# Patient Record
Sex: MALE | Race: WHITE | Employment: FULL TIME | URBAN - METROPOLITAN AREA
[De-identification: names, ages, dates, MRNs, and addresses within clinical notes are randomized per-mention and may not be internally consistent; named-entity substitution may affect disease eponyms.]

---

## 2020-06-19 ENCOUNTER — OFFICE VISIT (OUTPATIENT)
Dept: PODIATRY | Age: 60
End: 2020-06-19
Payer: COMMERCIAL

## 2020-06-19 VITALS — WEIGHT: 214 LBS | TEMPERATURE: 98.8 F | RESPIRATION RATE: 18 BRPM | BODY MASS INDEX: 28.99 KG/M2 | HEIGHT: 72 IN

## 2020-06-19 PROCEDURE — 99203 OFFICE O/P NEW LOW 30 MIN: CPT | Performed by: PODIATRIST

## 2020-06-19 PROCEDURE — 11721 DEBRIDE NAIL 6 OR MORE: CPT | Performed by: PODIATRIST

## 2020-06-19 RX ORDER — GLIMEPIRIDE 4 MG/1
4 TABLET ORAL
COMMUNITY

## 2020-06-19 RX ORDER — LISINOPRIL 10 MG/1
10 TABLET ORAL DAILY
COMMUNITY

## 2020-06-19 RX ORDER — FINASTERIDE 5 MG/1
5 TABLET, FILM COATED ORAL DAILY
COMMUNITY

## 2020-06-19 RX ORDER — ATORVASTATIN CALCIUM 20 MG/1
20 TABLET, FILM COATED ORAL DAILY
COMMUNITY

## 2020-06-19 NOTE — PROGRESS NOTES
[facility] PHYSICIANS  [facility]  [address] [place] [address]  Dept: [phone]  Dept Fax: [phone]    DIABETIC PROGRESS NOTE  Date of patient's visit: 6/19/2020  Patient's Name:  Izaiah Sky YOB: 1960            Patient Care Team:  Connell Moritz, MD as PCP - General (Family Medicine)  La Haines DPM as Physician (Podiatry)          Chief Complaint   Patient presents with    New Patient    Diabetes    Peripheral Neuropathy    Foot Pain    Nail Problem       Subjective:   Izaiah Sky comes to clinic for New Patient; Diabetes; Peripheral Neuropathy; Foot Pain; and Nail Problem    he is a diabetic and states that he needs a diabetic exam.  Pt currently has complaint of thickened, elongated nails that they cannot manage by themselves. Pt's primary care physician is Connell Moritz, MD last seen 06/04/2020. Pt's last blood sugar was unknown . No results found for: LABA1C   Complains of numbness in the feet bilat. No past medical history on file. Allergies not on file  Current Outpatient Medications on File Prior to Visit   Medication Sig Dispense Refill    atorvastatin (LIPITOR) 20 MG tablet Take 20 mg by mouth daily      finasteride (PROSCAR) 5 MG tablet Take 5 mg by mouth daily      glimepiride (AMARYL) 4 MG tablet Take 4 mg by mouth every morning (before breakfast)      lisinopril (PRINIVIL;ZESTRIL) 10 MG tablet Take 10 mg by mouth daily      metFORMIN (GLUCOPHAGE) 1000 MG tablet Take 1,000 mg by mouth 2 times daily (with meals)      SITagliptin (JANUVIA) 100 MG tablet Take 100 mg by mouth daily       No current facility-administered medications on file prior to visit.         Review of Systems    Review of Systems:   History obtained from chart review and the patient  General ROS: negative for - chills, fatigue, fever, night sweats or weight gain  Constitutional: Negative for chills, diaphoresis, fatigue, fever and unexpected weight change. Musculoskeletal: Positive for arthralgias, gait problem and joint swelling. Neurological ROS: negative for - behavioral changes, confusion, headaches or seizures. Negative for weakness and numbness. Dermatological ROS: negative for - mole changes, rash  Cardiovascular: Negative for leg swelling. Gastrointestinal: Negative for constipation, diarrhea, nausea and vomiting. Objective:  Dermatologic Exam:  Skin lesion/ulceration Absent . Skin No rashes or nodules noted. .   Skin is thin, with flaky sloughing skin as well as decreased hair growth to the lower leg  Small red hemosiderin deposits seen dorsal foot   Musculoskeletal:     1st MPJ ROM decreased, Bilateral.  Muscle strength 5/5, Bilateral.  Pain present upon palpation of toenails 1-5, Bilateral. decreased medial longitudinal arch, Bilateral.  Ankle ROM decreased,Bilateral.    Dorsally contracted digits present digits 2, Bilateral.     Vascular: DP pulses 1/4 bilateral.  PT pulses 0/4 bilateral.   CFT <5 seconds, Bilateral.  Hair growth absent to the level of the digits, Bilateral.  Edema present, Bilateral.  Varicosities absent, Bilateral. Erythema absent, Bilateral    Neurological: Sensation diminshed to light touch to level of digits, Bilateral.  Protective sensation intact 6/10 sites via 5.07/10g [place] Monofilament, Bilateral.  negative Tinel's, Bilateral.  negative Valleix sign, Bilateral.      Integument: Warm, dry, supple, Bilateral.  Open lesion absent, Bilateral.  Interdigital maceration absent to web spaces 4, Bilateral.  Nails 1-5 left and 1-5 right thickened > 3.0 mm, dystrophic and crumbly, discolored with subungual debris. Fissures absent, Bilateral.   General: AAO x 3 in NAD.     Derm  Toenail Description  Sites of Onychomycosis Involvement (Check affected area)  [x] [x] [x] [x] [x] [x] [x] [x] [x] [x]  5 4 3 2 1 1 2 3 4 5                          Right PRESSURES W PPG BILATERAL         Q7   []Yes    []No                Q8   [x]Yes    []No                     Q9   []Yes    []No    Plan:   Pt was evaluated and examined. Patient was given personalized discharge instructions. I am ordering non invasive vasuclar studies the patients lower legs are extremely cold and discolored with cyanosis. The patient has decreased hair growth to the lower extremites with a delayed capillary refill time. This test is necessary to see if vascular surgery involvement is necessary and would relieve patient of their painful symptoms. Nails 1-10 were debrided sharply in length and thickness with a nipper and , without incident. Pt will follow up in 9 weeks or sooner if any problems arise. Diagnosis was discussed with the pt and all of their questions were answered in detail. Proper foot hygiene and care was discussed with the pt. Informed patient on proper diabetic foot care and importance of tight glycemic control. Patient to check feet daily and contact the office with any questions/problems/concerns.    Other comorbidity noted and will be managed by PCP.  6/19/2020    Electronically signed by Ced Tapia DPM on 6/19/2020 at 9:37 AM  6/19/2020

## 2023-12-04 ENCOUNTER — HOSPITAL ENCOUNTER (OUTPATIENT)
Dept: PHYSICAL THERAPY | Age: 63
Setting detail: THERAPIES SERIES
Discharge: HOME | End: 2023-12-04
Payer: COMMERCIAL

## 2023-12-04 PROCEDURE — 97110 THERAPEUTIC EXERCISES: CPT

## 2023-12-04 PROCEDURE — 97162 PT EVAL MOD COMPLEX 30 MIN: CPT

## 2023-12-04 NOTE — THERAPY EVALUATION
[facility] [place]  [address]  [place]: [phone]   [place]: [phone]     Physical Therapy Evaluation    Date:  2023  Patient: Maria Dolores Toro  : 1960  MRN: [id number]  Physician: Marian Mckenzie MD     Insurance: Medical mutual ( combined with OT, NPRE)  Medical Diagnosis: M54.30 (ICD-10-CM) - Sciatica    Rehab Codes: M54.52 Left sided sciatica  Onset Date: 10/2023                                 Next 's appt: 24    Subjective:   CC: L sided low back pain   HPI: L sided low back pain with radiation into the lower extremity. He reports numbness in B feet. He reports going to his friends that had a inversion table that decreased the pain in the low back. He reports that he leg will get weak from the pain but he is able to catch himself. He reports his thigh is tender as well. 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 He reports he feels that his legs are getting weaker as well.  He reports that he feels that he will have hard times turning due his \"feet wont work\"     Aggravating Factors: sitting, laying down  Alleviating Factors:Walking    PMHx: [] Unremarkable [x] Diabetes [] HTN  [] Pacemaker   [] MI/Heart Problems [] Cancer [] Arthritis [x] Asthma                         [x] refer to full medical chart  In [facility]  [] Other:        Comorbidities:   [] Obesity [] Dialysis  [] Other:   [x] Asthma/COPD [] Dementia [] Other:   [] Stroke [] Sleep apnea [] Other:   [] Vascular disease [] Rheumatic disease [] Other:       Medications: [x] Refer to full medical record [] None [] Other:  Allergies:      [x] Refer to full medical record  [] None [] Other:    Function:    Patient lives with: Wife, daughter   In what type of home []  One story   [] Two story   [x] Split level   Number of stairs to enter 6   With handrail on the []  Right to enter   [x] Left to enter   Bathroom has a []  Tub only  [x]

## 2023-12-14 ENCOUNTER — HOSPITAL ENCOUNTER (OUTPATIENT)
Dept: PHYSICAL THERAPY | Age: 63
Setting detail: THERAPIES SERIES
Discharge: HOME | End: 2023-12-14
Payer: COMMERCIAL

## 2023-12-14 PROCEDURE — 97110 THERAPEUTIC EXERCISES: CPT

## 2023-12-14 NOTE — FLOWSHEET NOTE
[x] [facility] - [facility] & Therapy  [address]  [place]: [phone]   F: [phone]    Physical Therapy Daily Treatment Note      Date:  2023  Patient Name:  Go Vickers    :  1960  MRN: [id number]  Physician: Farshad Arroyo MD                                    Insurance: Medical mutual ( combined with OT, NPRE)  Medical Diagnosis: M54.30 (ICD-10-CM) - Sciatica                          Rehab Codes: M54.52 Left sided sciatica  Onset Date: 10/2023                                 Next 's appt: 24  Visit# / total visits:   Cancels/No Shows: 0/0    Subjective:    : Patient arrived and reports pain levels have remained the same intensity is intermittent. Patient states he was doing bridge exercise at home and was pushing through so much pain he caused himself to get sick. Pain:  [x] Yes  [] No Location:lower lumbar L>R Pain Rating: (0-10 scale) 6/10  Pain altered Tx:  [] No  [] Yes  Action:    Objective:  Modalities:   Precautions:  Exercises:  Exercise Reps/ Time Weight/ Level Comments   Mod kenny stretch 10x       LTR 10x5\" hold        Bridge 10x3\"       SKTC 3x15\"       HS stretch  3x15\"     PPT with TrA 10x5\" hold        SLR 10x3\" descent        William lying clamshell 10x2     SKFO 10x2 Green     March with TrA 10x2ea        Specific Instructions for next treatment[de-identified] Core activation, LE strengthening         Assessment:  first session post evaluation with exercises and stretches added to promote LE strengthening, core stability, and lower lumbar mobility. Patient often noted pain symptoms throughout but nothing of sharp/shooting nature. Patient required cueing to remain on task and only complete amount of repetitions asked to avoid exacerbating pains symptoms and increase in muscle soreness. Encouraged patient to work in smaller pain free ranges.  Patient had demonstrated poor endurance with core muscle activation, able to

## 2024-01-04 ENCOUNTER — HOSPITAL ENCOUNTER (OUTPATIENT)
Dept: PHYSICAL THERAPY | Age: 64
Setting detail: THERAPIES SERIES
Discharge: HOME | End: 2024-01-04
Payer: COMMERCIAL

## 2024-01-04 PROCEDURE — 97110 THERAPEUTIC EXERCISES: CPT

## 2024-01-04 NOTE — FLOWSHEET NOTE
[x] [facility]   Outpatient Rehabilitation & Therapy  [address]  P: [phone]   F: [phone]    Physical Therapy Daily Treatment Note      Date:  2024  Patient Name:  Roosevelt Tidwell    :  1960  MRN: [id number]  Physician: Bong Schmidt MD                                    Insurance: Medical mutual ( combined with OT, NPRE)  Medical Diagnosis: M54.30 (ICD-10-CM) - Sciatica                          Rehab Codes: M54.52 Left sided sciatica  Onset Date: 10/2023                                 Next 's appt: 24  Visit# / total visits:   Cancels/No Shows: 0/0    Subjective:    : Patient arrived and states he has noticed some improvements since start of PT but denies compliance with HEP. States he does some bed stretches when able to .     Pain:  [x] Yes  [] No Location:lower lumbar L>R Pain Rating: (0-10 scale) 3/10  Pain altered Tx:  [x] No  [] Yes  Action:    Objective:  Modalities:   Precautions:  Exercises:  Exercise Reps/ Time Weight/ Level Completed today Comments   Mod bong stretch 10x        LTR 10x5\" hold    x     Bridge 10x3\"   x Red ball behind knees     SKTC 3x15\"   x     HS stretch  3x20\"  x    PPT with TrA 10x5\" hold         SLR 15x3\" descent    x     Side lying clamshell 10x2  x    SKFO 10x2 Green  x    March with TrA 10x2ea   x           Palloff press 10xea Green x    Core walk outs  5x ea side  Blue  x Added 1/4   Pull downs  10x ea  Blue  x Added 1/4   Standing marches  10x  green x      Specific Instructions for next treatment:: Core activation, LE strengthening, continue progressing standing exercises as tolerated.          Assessment: : initial minutes spent with patient education emphasis importance of HEP compliance in order to progress in PT and make gains towards goals. Able to continue with exercises with some cramping noted in B hamstrings this date mostly with descending during SLR. Added standing core walkouts, and

## 2024-01-08 ENCOUNTER — HOSPITAL ENCOUNTER (OUTPATIENT)
Dept: PHYSICAL THERAPY | Age: 64
Setting detail: THERAPIES SERIES
Discharge: HOME | End: 2024-01-08
Payer: COMMERCIAL

## 2024-01-08 PROCEDURE — 97110 THERAPEUTIC EXERCISES: CPT

## 2024-01-08 NOTE — PROGRESS NOTES
[x] [facility]   Outpatient Rehabilitation & Therapy  [address]  P: [phone]   F: [phone]    Physical Therapy Daily Treatment and progress Note      Date:  2024  Patient Name:  Roosevelt Tidwell    :  1960  MRN: [id number]  Physician: Bong Schmidt MD                                    Insurance: Medical mutual ( combined with OT, NPRE)  Medical Diagnosis: M54.30 (ICD-10-CM) - Sciatica                          Rehab Codes: M54.52 Left sided sciatica  Onset Date: 10/2023                                 Next 's appt: 24  Visit# / total visits:   Cancels/No Shows: 00  Date of initial visit:23    Subjective:    :  Patient reports that he has been more compliant with HEP. He notes some improvement since beginning PT but continues to report discomfort on the L shin. He will have good days and bad days.     Pain:  [x] Yes  [] No Location:lower lumbar L>R Pain Rating: (0-10 scale) 3/10  Pain altered Tx:  [x] No  [] Yes  Action:    Objective:  Modalities:   Precautions:  Exercises:  Exercise Reps/ Time Weight/ Level Completed today Comments   Mod bong stretch 10x        LTR 10x5\" hold         Bridge 10x3\"    Red ball behind knees     SKTC 3x15\"        HS stretch  3x20\"      PPT with TrA 10x5\" hold         SLR 15x3\" descent         Side lying clamshell 10x2      SKFO 10x2 Green      March with TrA 10x2ea              Palloff press 10xea Blue x    Core walk outs  5x ea side  Blue  x Added 1/4   Pull downs  10x2 Blue  x Added 1/4   Standing marches  10x  green     Semitandem stance  3x30\"  x      Specific Instructions for next treatment:: Standing exercises as tolerated    Objective: p = pain, L = Lacks, WNL=within normal limits                 ROM  ° A/P STRENGTH   ROM     Left Right Left Right                 Lumbar               Flexion 75% p   Hip Flexion      4/5 5/5 Extension 100%   Ext     4+/5 4+/5 Rotation L100% R 100%   Abd     4+/5

## 2024-01-11 ENCOUNTER — HOSPITAL ENCOUNTER (OUTPATIENT)
Dept: PHYSICAL THERAPY | Age: 64
Setting detail: THERAPIES SERIES
Discharge: HOME | End: 2024-01-11
Payer: COMMERCIAL

## 2024-01-11 PROCEDURE — 97110 THERAPEUTIC EXERCISES: CPT

## 2024-01-11 NOTE — FLOWSHEET NOTE
[x] [facility]   Outpatient Rehabilitation & Therapy  [address]  P: [phone]   F: [phone]    Physical Therapy Daily Treatment      Date:  2024  Patient Name:  Roosevelt Tidwell    :  1960  MRN: [id number]  Physician: Bong Schmidt MD                                    Insurance: Medical mutual ( combined with OT, NPRE)  Medical Diagnosis: M54.30 (ICD-10-CM) - Sciatica                          Rehab Codes: M54.52 Left sided sciatica  Onset Date: 10/2023                                 Next 's appt: 24  Visit# / total visits:   Cancels/No Shows: 00  Date of initial visit:23    Subjective:    : Patient arrived and reports he has completed some stretching at home. Notices slight differences in pain, mobility and strength since start of PT.     Pain:  [x] Yes  [] No Location:lower lumbar L>R Pain Rating: (0-10 scale) 3/10  Pain altered Tx:  [x] No  [] Yes  Action:    Objective:  Modalities:   Precautions:  Exercises:  Exercise Reps/ Time Weight/ Level Completed today Comments   Mod bong stretch 10x        LTR 10x5\" hold         Bridge 10x3\"    Red ball behind knees     SKTC 3x15\"        HS stretch  3x20\"      PPT with TrA 10x5\" hold         SLR 15x3\" descent         Side lying clamshell 10x2      SKFO 10x2 Green      March with TrA 10x2ea              Palloff press 10xea Blue x    Core walk outs  5x ea side  Blue  x Added 1/4   Pull downs  10x2 Blue  x Added 1/4   Standing marches  10x  green     Semitandem stance  3x30\"  x    3 way hip  10x2 Green  x Added    Step ups fwd/lat  10x2 6' x Added       Specific Instructions for next treatment:: Standing exercises as tolerated      Assessment: continued with progressing in standing exercises. Able to add step ups forward, lateral,and resisted 3 way hip to address functional strength deficits, core stability, and promote better tolerance to activity. Patient requires cueing for

## 2024-01-15 ENCOUNTER — HOSPITAL ENCOUNTER (OUTPATIENT)
Dept: PHYSICAL THERAPY | Age: 64
Setting detail: THERAPIES SERIES
Discharge: HOME | End: 2024-01-15
Payer: COMMERCIAL

## 2024-01-15 PROCEDURE — 97110 THERAPEUTIC EXERCISES: CPT

## 2024-01-15 NOTE — FLOWSHEET NOTE
[x] [facility]   Outpatient Rehabilitation & Therapy  [address]  P: [phone]   F: [phone]    Physical Therapy Daily Treatment      Date:  1/15/2024  Patient Name:  Roosevelt Tidwell    :  1960  MRN: [id number]  Physician: Bong Schmidt MD                                    Insurance: Medical mutual ( combined with OT, NPRE)  Medical Diagnosis: M54.30 (ICD-10-CM) - Sciatica                          Rehab Codes: M54.52 Left sided sciatica  Onset Date: 10/2023                                 Next 's appt: 24  Visit# / total visits:   Cancels/No Shows: 00  Date of initial visit:23    Subjective:    1/15/24: Patient reports that he feels better when he is moving around.     Pain:  [x] Yes  [] No Location:lower lumbar L>R Pain Rating: (0-10 scale) 2/10  Pain altered Tx:  [x] No  [] Yes  Action:    Objective:  Modalities:   Precautions: Standard  Exercises:  Exercise Reps/ Time Weight/ Level Completed today Comments   Mod bong stretch 10x        LTR 10x5\" hold         Bridge 10x3\"    Red ball behind knees     SKTC 3x15\"        HS stretch  3x20\"      PPT with TrA 10x5\" hold         SLR 15x3\" descent         Side lying clamshell 10x2      SKFO 10x2 Green      March with TrA 10x2ea              Palloff press 10x2ea Blue x    Core walk outs  8x ea side  Blue  x Added 1/4   Pull downs/Rows 10x2 Blue  x Added 1/4   Standing marches  10x  green     Semitandem stance  3x30\"  x Eyes closed 2&3rd set   3 way hip  10x2 Green  x Added 1/11   Step ups fwd/lat  12x2 6' x Added 1/11    BW squat 2x10  x      Specific Instructions for next treatment:: Standing exercises as tolerated      Assessment:1/15: Continued with standing exercises today with increasing resistance. Added squats today with patient noting fatigue in the quadriceps. Increased reps with step ups today to challenge endurance. Patient continues to require occasional cues for correct posture and

## 2024-01-18 ENCOUNTER — HOSPITAL ENCOUNTER (OUTPATIENT)
Dept: PHYSICAL THERAPY | Age: 64
Setting detail: THERAPIES SERIES
Discharge: HOME | End: 2024-01-18
Payer: COMMERCIAL

## 2024-01-18 PROCEDURE — 97110 THERAPEUTIC EXERCISES: CPT

## 2024-01-18 NOTE — FLOWSHEET NOTE
Patient had better carryover of postural education demonstrating better erect posture.     [x] Progressing toward goals.    [] No change.     [] Other:    [x] Patient would continue to benefit from skilled physical therapy services in order to: decrease pain, L hip strength, and overall functional ability so that he can continue to work and be independent at home.     STG: (to be met in 6 treatments)  ? Pain:2/10 or less low back pain to improve ability to perform work tasks including lifting. Not met   ? ROM:Lumbar rotation to 100% to improve ability to push and pull objects at work MET  ? Strength:left knee flexion and extension to 4+/5 or better to decrease feeling of lower extremity weakness MET  ? Function: Mod oswestry 44% impairment or less to improve ability to perform ADLs MET  Independent with Home Exercise Programs MET     LTG: (to be met in 12 treatments)  Patient will go throughout the day without feeling of L lower extremity weakness to demonstrate improvement in overall endurance  Patient will report a decrease in L lower extremity numbness throughout leg to demonstrate centralization of neurological symptoms.         Patient goals:decrease pain    Pt. Education:  [x] Yes  [] No  [] Reviewed Prior HEP/Ed  Method of Education: [x] Verbal  [] Demo  [] Written  Comprehension of Education:  [x] Verbalizes understanding.  [x] Demonstrates understanding.  [] Needs review.  [x] Demonstrates/verbalizes HEP/Ed previously given.    Access Code: [email or web address]  URL: [email or web address]  Date: 01/08/2024  Prepared by: Shaggy Woodall    Exercises  - Modified Bong Stretch  - 1 x daily - 7 x weekly - 3 sets - 20-30 second hold  - Supine Lower Trunk Rotation  - 1 x daily - 7 x weekly - 2-3 sets - 10 reps  - Supine Bridge  - 1 x daily - 7 x weekly - 2-3 sets - 10 reps  - Standing Romberg to 3/4 Tandem Stance  - 1 x daily - 7 x weekly - 2-3 sets - 15-30 second hold  - Isometric Anti-Rotation Press  - 1 x daily - 7 x

## 2024-01-22 ENCOUNTER — HOSPITAL ENCOUNTER (OUTPATIENT)
Dept: PHYSICAL THERAPY | Age: 64
Setting detail: THERAPIES SERIES
Discharge: HOME | End: 2024-01-22
Payer: COMMERCIAL

## 2024-01-22 PROCEDURE — 97110 THERAPEUTIC EXERCISES: CPT

## 2024-01-22 NOTE — FLOWSHEET NOTE
sets - 15-30 second hold  - Isometric Anti-Rotation Press  - 1 x daily - 7 x weekly - 2-3 sets - 10 reps  - Shoulder extension with resistance - Neutral  - 1 x daily - 7 x weekly - 2-3 sets - 10 reps      Plan: [x] Continue per plan of care    [] Other:      Treatment Charges: Mins Units   []  Modalities     [x]  Ther Exercise 50 3   []  Manual Therapy     []  Ther Activities     []  Aquatics     []  Neuromuscular     [] Vasocompression     [] Gait Training     [] Dry needling        [] 1 or 2 muscles        [] 3 or more muscles     []  Other     Total Billable time 50 3     Time In:600 pm            Time Out: 650 pm     Electronically signed by:  Shaggy Woodall PT

## 2024-01-25 ENCOUNTER — HOSPITAL ENCOUNTER (OUTPATIENT)
Dept: PHYSICAL THERAPY | Age: 64
Setting detail: THERAPIES SERIES
Discharge: HOME | End: 2024-01-25
Payer: COMMERCIAL

## 2024-01-25 PROCEDURE — 97110 THERAPEUTIC EXERCISES: CPT

## 2024-01-25 NOTE — FLOWSHEET NOTE
Instructions for next treatment:: Standing exercises as tolerated      Assessment:  1/25 encouraged patient to better monitor blood sugar levels especially prior to PT sessions to avoid exacerbating symptoms and deem whether or not PT is safe to complete. Patient verbally confirmed he is okay to continue with PT session. Continued with most recent progressions with patient taking small standing rest breaks. Offered patient sitting rest breaks but patient denied. Did not note increase in fatigue or dizziness as session continued. No increase in pain symptoms. Continues to require cueing for proper exercise technique especially to avoid excessive UE utilization during standing 3 way hip and step ups to challenge proprioception and core stability with dynamic exercise. Demonstrates poor squat mechanics again today required visual and tactile feedback to correct with some carryover.     [x] Progressing toward goals.    [] No change.     [] Other:    [x] Patient would continue to benefit from skilled physical therapy services in order to: decrease pain, L hip strength, and overall functional ability so that he can continue to work and be independent at home.     STG: (to be met in 6 treatments)  ? Pain:2/10 or less low back pain to improve ability to perform work tasks including lifting. Not met   ? ROM:Lumbar rotation to 100% to improve ability to push and pull objects at work MET  ? Strength:left knee flexion and extension to 4+/5 or better to decrease feeling of lower extremity weakness MET  ? Function: Mod oswestry 44% impairment or less to improve ability to perform ADLs MET  Independent with Home Exercise Programs MET     LTG: (to be met in 12 treatments)  Patient will go throughout the day without feeling of L lower extremity weakness to demonstrate improvement in overall endurance  Patient will report a decrease in L lower extremity numbness throughout leg to demonstrate centralization of neurological symptoms.

## 2024-02-05 ENCOUNTER — HOSPITAL ENCOUNTER (OUTPATIENT)
Dept: PHYSICAL THERAPY | Age: 64
Setting detail: THERAPIES SERIES
Discharge: HOME | End: 2024-02-05
Payer: COMMERCIAL

## 2024-02-05 PROCEDURE — 97110 THERAPEUTIC EXERCISES: CPT

## 2024-02-05 NOTE — PROGRESS NOTES
100%   Abd     4+/5 4+/5 Sidebend L 100% R 100%   Add     4+/5 4+/5 -------------------- --------- --------   ER 90% 90%             Knee Flex     4+/5 4+/5         Ext      4+/5 4+/5               Functional Tests:  Semi tandem: L in back (30 seconds), R in back (30 seconds)  Mod Oswestry: 15/50=30% impairment         Assessment: 2/5: Patient has attended 12 visits of physical therapy in which he demonstrates improvement in pain, strength, and function since beginning therapy. He will still have occasional numbness down the left lower extremity but the frequency and intensity has decreased. He is able to perform more activity with greater ease and decreased feeling of weakness.  Patient reports he will continue with HEP on his own. Patient is to be discharged at this time. If patient is to require more physical therapy services, they are to obtain new script from physician.       STG: (to be met in 6 treatments)  ? Pain:2/10 or less low back pain to improve ability to perform work tasks including lifting.MET  ? ROM:Lumbar rotation to 100% to improve ability to push and pull objects at work MET  ? Strength:left knee flexion and extension to 4+/5 or better to decrease feeling of lower extremity weakness MET  ? Function: Mod oswestry 44% impairment or less to improve ability to perform ADLs MET  Independent with Home Exercise Programs MET     LTG: (to be met in 12 treatments)  Patient will go throughout the day without feeling of L lower extremity weakness to demonstrate improvement in overall endurance MET  Patient will report a decrease in L lower extremity numbness throughout leg to demonstrate centralization of neurological symptoms. Not met        Patient goals:decrease pain    Pt. Education:  [x] Yes  [] No  [] Reviewed Prior HEP/Ed  Method of Education: [x] Verbal  [] Demo  [] Written  Comprehension of Education:  [x] Verbalizes understanding.  [x] Demonstrates understanding.  [] Needs review.  [x]

## 2024-05-20 ENCOUNTER — HOSPITAL ENCOUNTER (EMERGENCY)
Age: 64
Discharge: HOME | End: 2024-05-20
Attending: STUDENT IN AN ORGANIZED HEALTH CARE EDUCATION/TRAINING PROGRAM
Payer: COMMERCIAL

## 2024-05-20 VITALS
SYSTOLIC BLOOD PRESSURE: 161 MMHG | OXYGEN SATURATION: 97 % | HEART RATE: 97 BPM | DIASTOLIC BLOOD PRESSURE: 87 MMHG | WEIGHT: 215 LBS | HEIGHT: 72 IN | RESPIRATION RATE: 16 BRPM | BODY MASS INDEX: 29.12 KG/M2 | TEMPERATURE: 98.2 F

## 2024-05-20 DIAGNOSIS — H65.91 FLUID LEVEL BEHIND TYMPANIC MEMBRANE OF RIGHT EAR: Primary | ICD-10-CM

## 2024-05-20 PROCEDURE — 6370000000 HC RX 637 (ALT 250 FOR IP)

## 2024-05-20 PROCEDURE — 99283 EMERGENCY DEPT VISIT LOW MDM: CPT

## 2024-05-20 RX ORDER — ACETAMINOPHEN 325 MG/1
650 TABLET ORAL ONCE
Status: COMPLETED | OUTPATIENT
Start: 2024-05-20 | End: 2024-05-20

## 2024-05-20 RX ORDER — CETIRIZINE HYDROCHLORIDE 10 MG/1
10 TABLET ORAL DAILY
Qty: 90 TABLET | Refills: 3 | Status: SHIPPED | OUTPATIENT
Start: 2024-05-20

## 2024-05-20 RX ORDER — CETIRIZINE HYDROCHLORIDE 10 MG/1
10 TABLET ORAL ONCE
Status: COMPLETED | OUTPATIENT
Start: 2024-05-20 | End: 2024-05-20

## 2024-05-20 RX ORDER — ACETAMINOPHEN 325 MG/1
325 TABLET ORAL EVERY 6 HOURS PRN
Qty: 30 TABLET | Refills: 0 | Status: SHIPPED | OUTPATIENT
Start: 2024-05-20

## 2024-05-20 RX ADMIN — CETIRIZINE HYDROCHLORIDE 10 MG: 10 TABLET, FILM COATED ORAL at 22:51

## 2024-05-20 RX ADMIN — ACETAMINOPHEN 650 MG: 325 TABLET ORAL at 22:51

## 2024-05-20 ASSESSMENT — PAIN - FUNCTIONAL ASSESSMENT
PAIN_FUNCTIONAL_ASSESSMENT: ACTIVITIES ARE NOT PREVENTED
PAIN_FUNCTIONAL_ASSESSMENT: 0-10

## 2024-05-20 ASSESSMENT — PAIN DESCRIPTION - DESCRIPTORS: DESCRIPTORS: THROBBING

## 2024-05-20 ASSESSMENT — PAIN SCALES - GENERAL
PAINLEVEL_OUTOF10: 8
PAINLEVEL_OUTOF10: 9

## 2024-05-20 ASSESSMENT — PAIN DESCRIPTION - ORIENTATION
ORIENTATION: RIGHT
ORIENTATION: RIGHT

## 2024-05-20 ASSESSMENT — LIFESTYLE VARIABLES
HOW MANY STANDARD DRINKS CONTAINING ALCOHOL DO YOU HAVE ON A TYPICAL DAY: PATIENT DOES NOT DRINK
HOW OFTEN DO YOU HAVE A DRINK CONTAINING ALCOHOL: NEVER

## 2024-05-20 ASSESSMENT — PAIN DESCRIPTION - LOCATION
LOCATION: EAR
LOCATION: EAR

## 2024-05-21 NOTE — DISCHARGE INSTRUCTIONS
You were seen today in the emergency department for your ear pain.  We have evaluated you and determined that you likely have a middle ear effusion.  We now feel you are safe for discharge home.  Please take Zyrtec and Tylenol as needed.  Please follow-up with your ear nose and throat doctor    Please return to the emergency department immediately if develop any new or worsening concerns including chest pain, shortness of breath, abdominal pain, nausea, vomiting, diarrhea, weakness, loss consciousness, fever, chills, or any other concerns.    Please call your PCP and schedule appointment within the next 24 to 48 hours for follow-up.

## 2024-05-21 NOTE — ED PROVIDER NOTES
[facility] ED  Emergency Department  Faculty Attestation       I performed a history and physical examination of the patient and discussed management with the resident. I reviewed the resident’s note and agree with the documented findings including all diagnostic interpretations and plan of care. Any areas of disagreement are noted on the chart. I was personally present for the key portions of any procedures. I have documented in the chart those procedures where I was not present during the key portions. I have reviewed the emergency nurses triage note. I agree with the chief complaint, past medical history, past surgical history, allergies, medications, social and family history as documented unless otherwise noted below. Documentation of the HPI, Physical Exam and Medical Decision Making performed by scribchloé is based on my personal performance of the HPI, PE and MDM. For Physician Assistant/ Nurse Practitioner cases/documentation I have personally evaluated this patient and have completed at least one if not all key elements of the E/M (history, physical exam, and MDM). Additional findings are as noted.    Pertinent Comments     Primary Care Physician: Bong Schmidt MD    History: This is a 64 y.o. male who presents to the Emergency Department with complaint of    Chief Complaint   Patient presents with    Otalgia         Physical:    ED Triage Vitals   BP Temp Temp Source Pulse Respirations SpO2 Height Weight - Scale   05/20/24 2219 05/20/24 2219 05/20/24 2219 05/20/24 2219 05/20/24 2219 05/20/24 2219 05/20/24 2220 05/20/24 2220   (!) 161/87 98.2 °F (36.8 °C) Oral 97 16 97 % 1.829 m (6') 97.5 kg (215 lb)        RADIOLOGY:  No results found.    MDM/Plan:   Right ear discomfort x3 days  Recently taken off his antihistamine medication that he was taking OTC by his physician   Believe the antihistamine he was taking over-the-counter likely was Benadryl given his age is why his primary care provider took 
ear pain.  Patient is GCS 15, nontoxic appearing, not in acute distress, speaking full sentences, able to ambulate under their own power.  Patient is afebrile, normotensive, nontachycardic, satting well on room air.  Lamination revealed a middle ear effusion.  Likely rebound congestion from stopping antihistamines that he is on chronically.  Will start patient on Zyrtec, Tylenol for pain control, return precautions, follow-up with ENT      Risk  OTC drugs.        EKG      All EKG's are interpreted by the Emergency Department Physician who either signs or Co-signs this chart in the absence of a cardiologist.    EMERGENCY DEPARTMENT COURSE:           PROCEDURES:      CONSULTS:  None    CRITICAL CARE:  There was significant risk of life threatening deterioration of patient's condition requiring my direct management. Critical care time  minutes, excluding any documented procedures.    FINAL IMPRESSION      1. Fluid level behind tympanic membrane of right ear          DISPOSITION / PLAN     DISPOSITION Decision To Discharge 05/20/2024 10:47:50 PM      PATIENT REFERRED TO:  [facility]ong Schmidt MD  [address] [facility] [address] [address]  [phone]    Schedule an appointment as soon as possible for a visit in 1 day        DISCHARGE MEDICATIONS:  New Prescriptions    ACETAMINOPHEN (TYLENOL) 325 MG TABLET    Take 1 tablet by mouth every 6 hours as needed for Pain    CETIRIZINE (ZYRTEC) 10 MG TABLET    Take 1 tablet by mouth daily       Chris Cuba MD  Emergency Medicine Resident    (Please note that portions of this note were completed with a voice recognition program.  Efforts were made to edit the dictations but occasionally words are mis-transcribed.)

## 2024-05-21 NOTE — ED TRIAGE NOTES
Mode of arrival (squad #, walk in, police, etc) : walk-in        Chief complaint(s): ear pain        Arrival Note (brief scenario, treatment PTA, etc).: Pt reports right ear pain x 3-4 days.         C= \"Have you ever felt that you should Cut down on your drinking?\"  No  A= \"Have people Annoyed you by criticizing your drinking?\"  No  G= \"Have you ever felt bad or Guilty about your drinking?\"  No  E= \"Have you ever had a drink as an Eye-opener first thing in the morning to steady your nerves or to help a hangover?\"  No      Deferred []      Reason for deferring: N/A    *If yes to two or more: probable alcohol abuse.*

## 2024-11-29 ENCOUNTER — HOSPITAL ENCOUNTER (OUTPATIENT)
Dept: MRI IMAGING | Age: 64
Discharge: HOME | End: 2024-12-01
Payer: MEDICARE

## 2024-11-29 DIAGNOSIS — M51.362 OTHER INTERVERTEBRAL DISC DEGENERATION, LUMBAR REGION WITH DISCOGENIC BACK PAIN AND LOWER EXTREMITY PAIN: ICD-10-CM

## 2024-11-29 PROCEDURE — 72148 MRI LUMBAR SPINE W/O DYE: CPT
